# Patient Record
Sex: FEMALE | Race: WHITE | Employment: OTHER | URBAN - METROPOLITAN AREA
[De-identification: names, ages, dates, MRNs, and addresses within clinical notes are randomized per-mention and may not be internally consistent; named-entity substitution may affect disease eponyms.]

---

## 2020-03-12 RX ORDER — SENNOSIDES 8.6 MG
650 CAPSULE ORAL DAILY
COMMUNITY

## 2020-03-12 SDOH — HEALTH STABILITY: MENTAL HEALTH: HOW OFTEN DO YOU HAVE A DRINK CONTAINING ALCOHOL?: NEVER

## 2020-03-18 ENCOUNTER — HOSPITAL ENCOUNTER (OUTPATIENT)
Age: 75
Setting detail: OUTPATIENT SURGERY
Discharge: HOME | End: 2020-03-18
Attending: PLASTIC SURGERY | Admitting: PLASTIC SURGERY
Payer: MEDICARE

## 2020-03-18 VITALS
WEIGHT: 143.8 LBS | BODY MASS INDEX: 25.48 KG/M2 | HEART RATE: 92 BPM | HEIGHT: 63 IN | SYSTOLIC BLOOD PRESSURE: 143 MMHG | TEMPERATURE: 97.8 F | RESPIRATION RATE: 16 BRPM | OXYGEN SATURATION: 94 % | DIASTOLIC BLOOD PRESSURE: 76 MMHG

## 2020-03-18 PROCEDURE — 3600000002 HC SURGERY LEVEL 2 BASE: Performed by: PLASTIC SURGERY

## 2020-03-18 PROCEDURE — 2500000003 HC RX 250 WO HCPCS: Performed by: PLASTIC SURGERY

## 2020-03-18 PROCEDURE — 7100000011 HC PHASE II RECOVERY - ADDTL 15 MIN: Performed by: PLASTIC SURGERY

## 2020-03-18 PROCEDURE — 2709999900 HC NON-CHARGEABLE SUPPLY: Performed by: PLASTIC SURGERY

## 2020-03-18 PROCEDURE — 7100000010 HC PHASE II RECOVERY - FIRST 15 MIN: Performed by: PLASTIC SURGERY

## 2020-03-18 PROCEDURE — 88305 TISSUE EXAM BY PATHOLOGIST: CPT

## 2020-03-18 PROCEDURE — 3600000012 HC SURGERY LEVEL 2 ADDTL 15MIN: Performed by: PLASTIC SURGERY

## 2020-03-18 RX ORDER — LIDOCAINE HYDROCHLORIDE AND EPINEPHRINE 10; 10 MG/ML; UG/ML
INJECTION, SOLUTION INFILTRATION; PERINEURAL
Status: DISCONTINUED
Start: 2020-03-18 | End: 2020-03-18 | Stop reason: HOSPADM

## 2020-03-18 RX ORDER — LIDOCAINE HYDROCHLORIDE AND EPINEPHRINE 10; 10 MG/ML; UG/ML
INJECTION, SOLUTION INFILTRATION; PERINEURAL PRN
Status: DISCONTINUED | OUTPATIENT
Start: 2020-03-18 | End: 2020-03-18 | Stop reason: ALTCHOICE

## 2020-03-18 ASSESSMENT — PAIN - FUNCTIONAL ASSESSMENT: PAIN_FUNCTIONAL_ASSESSMENT: 0-10

## 2020-03-18 NOTE — H&P
Subjective:   Patient ID: Bernardo Caicedo is a 66 y.o. female. HPI   Patient presents today to discuss excision of a growth on the L calf. She says she developed a red sore in early February she saw her PCP Dr Rosita Pierce who put her on Keflex, she took the RX and the redness went down some. Then this area in question started to grow thicker, it is a dry/cracked area that she says is not painful. Denies any personal or family Hx of skin cancers. She has Hx of HTN, and mentions R hip replacement in May of 2019. Review of Systems   Constitutional: Negative. HENT: Negative for congestion and trouble swallowing. Respiratory: Negative for cough and shortness of breath. Cardiovascular: Negative for chest pain. Gastrointestinal: Negative for abdominal pain. Neurological: Negative for light-headedness and headaches. Psychiatric/Behavioral: Negative for dysphoric mood. Medical History     Medical History     Diagnosis Date Comment Source   Thyroid disease   Provider      Other Medical History     Diagnosis Date Comment Source   Hypertension   Provider   Hyperlipidemia   Provider      Family History     No family history on file. Social History     Tobacco History     Smoking Status  Never Smoker   Smokeless Tobacco Use  Never Used   Alcohol History     Alcohol Use Status  Yes Comment  weekends   Drug Use     Drug Use Status  Never   Sexual Activity     Sexually Active  Not Asked    Surgical History     Procedure Laterality Date Comment Source   JOINT REPLACEMENT Right  [facility] Provider   HIP ARTHROPLASTY Right 2019  Provider   E-Cigarettes Vaping or Juuling     Questions   Vaping Use   Responses: Never User   Start Date   Does device contain nicotine? Quit Date   Vaping Type   Socioeconomic History     Employment History     No employment history on file.    Family and Education     Marital Status      Social Identity     Preferred Language Ethnicity Race   English Non-/Non  [place] Financial Resource Strain     No financial resource strain value on [employer]     No food insecurity information on file   Transportation Needs     No transportation needs information on file        Medications Prior Authorizations   New medications from outside sources are available for reconciliation    acetaminophen (TYLENOL) 650 MG extended release tablet Take 650 mg by mouth Daily    simvastatin (ZOCOR) 40 MG tablet Take 1 tablet by mouth daily    levothyroxine (SYNTHROID) 25 MCG tablet Take 1 tablet by mouth daily    amLODIPine (NORVASC) 5 MG tablet Take 1 tablet by mouth daily    clindamycin (CLEOCIN) 150 MG capsule Take one PO QID. Start one day prior to surgery. aspirin 325 MG tablet Take 325 mg by mouth every 6 hours as needed            Objective:   Physical Exam   Vitals signs and nursing note reviewed. Exam conducted with a chaperone present. Constitutional:   Appearance: Normal appearance. HENT:   Head: Normocephalic and atraumatic. Mouth/Throat:   Mouth: Mucous membranes are moist.   Eyes:   Extraocular Movements: Extraocular movements intact. Conjunctiva/sclera: Conjunctivae normal.   Pupils: Pupils are equal, round, and reactive to light. Pulmonary:   Effort: Pulmonary effort is normal.   Musculoskeletal:   Legs:    Skin:   General: Skin is warm and dry. Neurological:   General: No focal deficit present. Mental Status: She is alert and oriented to person, place, and time. Assessment:     Lesion right leg. Plan:     Scheduled for excision. I have discussed with the patient the indication, alternatives, and the possible risks and/or complications which include but are not limited to those delineated on the consent form for the planned procedure and the anesthesia methods. All questions were answered to patient's satisfaction. Consent was reviewed and signed.

## 2020-03-20 LAB — DERMATOLOGY PATHOLOGY REPORT: NORMAL

## 2020-03-21 NOTE — OP NOTE
[address] [facility] [address]                                OPERATIVE REPORT    PATIENT NAME: Karla Menezes                    :        1945  MED REC NO:   [id number]                             ROOM:  ACCOUNT NO:   [de-identified]                           ADMIT DATE: 2020  PROVIDER:     Nii Carcamo    DATE OF PROCEDURE:  2020    ATTENDING PHYSICIAN AND SURGEON:  Nii Carcamo MD    PREOPERATIVE DIAGNOSIS:  Suspicious lesion of left leg. POSTOPERATIVE DIAGNOSIS:  Suspicious lesion of left leg. PROCEDURE:  Excision of lesion of left leg (2.0 cm), taken with a 3-mm  margin and with intermediate repair of 6.0 cm length. ANESTHESIA:  Local 1% Xylocaine with epinephrine, buffered. INDICATIONS:  The patient is a 64-year-old female with a growing lesion  on the left leg, suspicious for malignancy. She presents at this time  for removal and diagnosis. The procedure, the risks, the benefits were  discussed with her. All questions were answered to her satisfaction. Consent was signed. NARRATIVE SUMMARY:  The patient was brought to the operating suite,  placed in the supine position. She was then prepped and draped in the  usual sterile fashion. Next, using a marker, outline was made for  excision along with margin. Local anesthetic was infiltrated. Next, with a scalpel, incision was carried out thru the full-thickness of  the skin. The lesion was excised including underlying subcutaneous  tissues and was sent off as specimen. Bovie cautery was used for  hemostasis. The wound was then closed in layers with interrupted 4-0  Vicryl in the subcutaneous and running intracuticular 4-0 Prolene in the  skin. Steri-Strips for dressing. The patient tolerated the procedure  well. Blood loss, minimal.  Specimens x1. The patient was transferred  to recovery in stable condition.         Tisha Henry LISSA Teran    D: 03/20/2020 21:53:11       T: 03/20/2020 22:02:43     [email or web address]  Job#: [id number]     Doc#: [id number]    CC:

## 2020-03-29 PROBLEM — C44.729 SQUAMOUS CELL CARCINOMA OF LEFT LOWER LEG: Status: ACTIVE | Noted: 2020-03-29

## 2020-05-18 ENCOUNTER — HOSPITAL ENCOUNTER (OUTPATIENT)
Dept: PREADMISSION TESTING | Age: 75
Setting detail: SPECIMEN
Discharge: HOME | End: 2020-05-22
Payer: MEDICARE

## 2020-05-18 PROCEDURE — U0003 INFECTIOUS AGENT DETECTION BY NUCLEIC ACID (DNA OR RNA); SEVERE ACUTE RESPIRATORY SYNDROME CORONAVIRUS 2 (SARS-COV-2) (CORONAVIRUS DISEASE [COVID-19]), AMPLIFIED PROBE TECHNIQUE, MAKING USE OF HIGH THROUGHPUT TECHNOLOGIES AS DESCRIBED BY CMS-2020-01-R: HCPCS

## 2020-05-19 LAB
SARS-COV-2, PCR: NORMAL
SARS-COV-2, RAPID: NORMAL
SARS-COV-2: NOT DETECTED
SOURCE: NORMAL

## 2020-05-20 ENCOUNTER — HOSPITAL ENCOUNTER (OUTPATIENT)
Age: 75
Setting detail: OUTPATIENT SURGERY
Discharge: HOME | End: 2020-05-20
Attending: PLASTIC SURGERY | Admitting: PLASTIC SURGERY
Payer: MEDICARE

## 2020-05-20 VITALS
HEART RATE: 87 BPM | DIASTOLIC BLOOD PRESSURE: 72 MMHG | HEIGHT: 63 IN | SYSTOLIC BLOOD PRESSURE: 134 MMHG | WEIGHT: 144 LBS | OXYGEN SATURATION: 92 % | BODY MASS INDEX: 25.52 KG/M2 | TEMPERATURE: 97.3 F | RESPIRATION RATE: 12 BRPM

## 2020-05-20 PROCEDURE — 2709999900 HC NON-CHARGEABLE SUPPLY: Performed by: PLASTIC SURGERY

## 2020-05-20 PROCEDURE — 7100000010 HC PHASE II RECOVERY - FIRST 15 MIN: Performed by: PLASTIC SURGERY

## 2020-05-20 PROCEDURE — 88305 TISSUE EXAM BY PATHOLOGIST: CPT

## 2020-05-20 PROCEDURE — 3600000002 HC SURGERY LEVEL 2 BASE: Performed by: PLASTIC SURGERY

## 2020-05-20 PROCEDURE — 2500000003 HC RX 250 WO HCPCS: Performed by: PLASTIC SURGERY

## 2020-05-20 PROCEDURE — 3600000012 HC SURGERY LEVEL 2 ADDTL 15MIN: Performed by: PLASTIC SURGERY

## 2020-05-20 PROCEDURE — 7100000011 HC PHASE II RECOVERY - ADDTL 15 MIN: Performed by: PLASTIC SURGERY

## 2020-05-20 RX ORDER — LIDOCAINE HYDROCHLORIDE AND EPINEPHRINE 10; 10 MG/ML; UG/ML
INJECTION, SOLUTION INFILTRATION; PERINEURAL PRN
Status: DISCONTINUED | OUTPATIENT
Start: 2020-05-20 | End: 2020-05-20 | Stop reason: ALTCHOICE

## 2020-05-20 RX ORDER — LIDOCAINE HYDROCHLORIDE AND EPINEPHRINE 10; 10 MG/ML; UG/ML
INJECTION, SOLUTION INFILTRATION; PERINEURAL
Status: DISCONTINUED
Start: 2020-05-20 | End: 2020-05-20 | Stop reason: HOSPADM

## 2020-05-20 RX ORDER — SODIUM BICARBONATE 42 MG/ML
INJECTION, SOLUTION INTRAVENOUS
Status: DISCONTINUED
Start: 2020-05-20 | End: 2020-05-20 | Stop reason: WASHOUT

## 2020-05-20 ASSESSMENT — PAIN SCALES - GENERAL: PAINLEVEL_OUTOF10: 0

## 2020-05-20 NOTE — H&P
Subjective:   Patient ID: Amarjit Davis is a 66 y.o. female. HPI   Patient is here PO 3/18/2020 excision lesion left lower leg, suture intact, denies pain at this time. Review of Systems   Constitutional: Negative. HENT: Negative for congestion and trouble swallowing. Respiratory: Negative for cough and shortness of breath. Cardiovascular: Negative for chest pain. Gastrointestinal: Negative for abdominal pain. Neurological: Negative for light-headedness and headaches. Psychiatric/Behavioral: Negative for dysphoric mood. Medical History     Medical History     Diagnosis Date Comment Source   Thyroid disease   Provider      Other Medical History     Diagnosis Date Comment Source   Hypertension   Provider   Hyperlipidemia   Provider      Family History     No family history on file. Social History     Tobacco History     Smoking Status  Never Smoker   Smokeless Tobacco Use  Never Used   Alcohol History     Alcohol Use Status  Yes Comment  weekends   Drug Use     Drug Use Status  Never   Sexual Activity     Sexually Active  Not Asked    Surgical History     Procedure Laterality Date Comment Source   PRE-MALIGNANT / BENIGN SKIN LESION EXCISION Left 03/18/2020 lower leg Provider   PLEURAL CATHETER INSERTION Left 3/18/2020 LEG LESION BIOPSY EXCISION - LOWER LEG performed by Marisela Catalan MD at [address] [facility]. Provider   KNEE ARTHROSCOPY Left  meniscal tear Provider   JOINT REPLACEMENT Right  [facility] Provider   HIP ARTHROPLASTY Right 2019  Provider   COLONOSCOPY    Provider   E-Cigarettes Vaping or Juuling     Questions   Vaping Use   Responses: Never User   Start Date   Does device contain nicotine? Quit Date   Vaping Type   Socioeconomic History     Employment History     No employment history on file.    Family and Education     Marital Status      Social Identity     Preferred Language Ethnicity Race   English Non-/Non  Daniel Love     No financial resource strain value on [employer]     No food insecurity information on file   Transportation Needs     No transportation needs information on file        Outpatient Medications    clindamycin (CLEOCIN) 150 MG capsule Take one PO QID. Start one day prior to surgery. acetaminophen (TYLENOL) 650 MG extended release tablet Take 650 mg by mouth Daily    simvastatin (ZOCOR) 40 MG tablet Take 1 tablet by mouth daily    levothyroxine (SYNTHROID) 25 MCG tablet Take 1 tablet by mouth daily    amLODIPine (NORVASC) 5 MG tablet Take 1 tablet by mouth daily    aspirin 325 MG tablet Take 325 mg by mouth every 6 hours as needed          Objective:   Physical Exam   Vitals signs and nursing note reviewed. Exam conducted with a chaperone present. Constitutional:   Appearance: Normal appearance. HENT:   Head: Normocephalic and atraumatic. Mouth/Throat:   Mouth: Mucous membranes are moist.   Eyes:   Extraocular Movements: Extraocular movements intact. Conjunctiva/sclera: Conjunctivae normal.   Pupils: Pupils are equal, round, and reactive to light. Pulmonary:   Effort: Pulmonary effort is normal.   Skin:   General: Skin is warm and dry. Neurological:   General: No focal deficit present. Mental Status: She is alert and oriented to person, place, and time. Wound healing well. No infection. Sutures removed. Path showed SCC, extends to deep margin. This was discussed with her. I discussed that the report means that there may still be tumor remaining deep. Recommendation is to re-excise to get deep margin. She wishes to proceed. Assessment:     SCC left leg. Plan:     Scheduled for re-excision. I have discussed with the patient the indication, alternatives, and the possible risks and/or complications which include but are not limited to those delineated on the consent form for the planned procedure and the anesthesia methods. All questions were answered to patient's satisfaction.

## 2020-05-22 LAB — DERMATOLOGY PATHOLOGY REPORT: NORMAL

## 2020-05-23 NOTE — OP NOTE
[address]                  [facility]                                OPERATIVE REPORT    PATIENT NAME: Jeanne Colmenares                    :        1945  MED REC NO:   [id number]                             ROOM:  ACCOUNT NO:   [de-identified]                           ADMIT DATE: 2020  PROVIDER:     Gino Obrien    DATE OF PROCEDURE:  2020    ATTENDING PHYSICIAN AND SURGEON:  Gino Obrien MD    PREOPERATIVE DIAGNOSIS:  Squamous carcinoma of the left leg. POSTOPERATIVE DIAGNOSIS:  Squamous carcinoma of the left leg. PROCEDURE:  Re-excision of squamous carcinoma of the left leg (7.0 cm),  taken with an 8-mm measured margin, with intermediate repair of 7.0 cm  length. ANESTHESIA:  Local 1% Xylocaine with epinephrine, buffered. INDICATIONS:  The patient is a 73-year-old female who underwent previous  excision of squamous carcinoma of the left leg. Peripheral margins were  clear; however, the lesion did extend to the deep margin. She presents  at this time now for re-excision to get a good margin. The procedure,  the risks, the benefits were discussed with her as well as possible  increased risk of COVID-19 coming to the facility. All questions were  answered to her satisfaction. Both consents were signed. NARRATIVE SUMMARY:  The patient was brought to the operating suite,  placed in the supine position. She was prepped and draped in the usual  sterile fashion. Next, using a marker, outline was made for the  excision. Local anesthetic was infiltrated. Next, with a scalpel,  incision was carried out around the perimeter of the margin and deepened  through the skin and down through the subcutaneous and excised just  superficial to the fascia of the leg and sent off as specimen. Bovie  cautery was used for hemostasis.   Wound was then closed with interrupted  4-0 Vicryl in the deep subcutaneous,

## 2020-06-09 PROBLEM — E78.2 MIXED HYPERLIPIDEMIA: Status: ACTIVE | Noted: 2019-05-24

## 2020-06-09 PROBLEM — M16.11 PRIMARY OSTEOARTHRITIS OF RIGHT HIP: Status: ACTIVE | Noted: 2019-05-23

## 2020-06-09 PROBLEM — E03.9 HYPOTHYROIDISM: Status: ACTIVE | Noted: 2019-05-24

## 2020-06-09 PROBLEM — I10 ESSENTIAL HYPERTENSION: Status: ACTIVE | Noted: 2019-05-24

## (undated) DEVICE — SUTURE COAT VCRL SZ 4-0 L18IN ABSRB UD L19MM PS-2 1/2 CIR J496G

## (undated) DEVICE — SOLUTION IV IRRIG POUR BRL 0.9% SODIUM CHL 2F7124

## (undated) DEVICE — INTENDED FOR TISSUE SEPARATION, AND OTHER PROCEDURES THAT REQUIRE A SHARP SURGICAL BLADE TO PUNCTURE OR CUT.: Brand: BARD-PARKER ® CARBON RIB-BACK BLADES

## (undated) DEVICE — GOWN,AURORA,NONRNF,XL,30/CS: Brand: MEDLINE

## (undated) DEVICE — SUTURE VCRL SZ 4-0 L18IN ABSRB UD L13MM P-3 3/8 CIR PRIM J494H

## (undated) DEVICE — SUTURE VCRL + SZ 4-0 L18IN ABSRB UD P-3 3/8 CIR REV CUT NDL VCP494H

## (undated) DEVICE — SOLUTION SURG PREP POV IOD 7.5% 4 OZ

## (undated) DEVICE — SUTURE PROL SZ 4-0 L18IN NONABSORBABLE BLU L16MM PC-3 3/8 8634G

## (undated) DEVICE — NEEDLE FLTR 18GA L1.5IN MEM THK5UM BLNT DISP

## (undated) DEVICE — GLOVE ORANGE PI 7   MSG9070

## (undated) DEVICE — GLOVE ORANGE PI 7 1/2   MSG9075

## (undated) DEVICE — SUTURE VCRL SZ 4-0 L18IN ABSRB UD L19MM PS-2 3/8 CIR PRIM J496H

## (undated) DEVICE — MHPB HAND AND FOOT PACK: Brand: MEDLINE INDUSTRIES, INC.

## (undated) DEVICE — STANDARD HYPODERMIC NEEDLE,POLYPROPYLENE HUB: Brand: MONOJECT

## (undated) DEVICE — SYRINGE MED 3ML CLR PLAS STD N CTRL LUERLOCK TIP DISP

## (undated) DEVICE — PENCIL ES L3M BTTN SWCH HOLSTER W/ BLDE ELECTRD EDGE

## (undated) DEVICE — ELECTRODE PT RET AD L9FT HI MOIST COND ADH HYDRGEL CORDED

## (undated) DEVICE — STRIP,CLOSURE,WOUND,MEDI-STRIP,1/2X4: Brand: MEDLINE

## (undated) DEVICE — SYRINGE MED 10ML LUERLOCK TIP W/O SFTY DISP

## (undated) DEVICE — Z DISCONTINUED BY MEDLINE USE 2711682 TRAY SKIN PREP DRY W/ PREM GLV